# Patient Record
Sex: FEMALE | URBAN - METROPOLITAN AREA
[De-identification: names, ages, dates, MRNs, and addresses within clinical notes are randomized per-mention and may not be internally consistent; named-entity substitution may affect disease eponyms.]

---

## 2021-11-09 ENCOUNTER — OFFICE VISIT (OUTPATIENT)
Dept: URBAN - METROPOLITAN AREA CLINIC 6 | Facility: CLINIC | Age: 56
End: 2021-11-09
Payer: MEDICARE

## 2021-11-09 DIAGNOSIS — H04.123 TEAR FILM INSUFFICIENCY OF BILATERAL LACRIMAL GLANDS: ICD-10-CM

## 2021-11-09 DIAGNOSIS — E11.9 TYPE 2 DIABETES MELLITUS WITHOUT COMPLICATIONS: Primary | ICD-10-CM

## 2021-11-09 DIAGNOSIS — H25.813 COMBINED FORMS OF AGE-RELATED CATARACT, BILATERAL: ICD-10-CM

## 2021-11-09 DIAGNOSIS — H53.8 OTHER VISUAL DISTURBANCES: ICD-10-CM

## 2021-11-09 DIAGNOSIS — H43.313 VITREOUS MEMBRANES AND STRANDS, BILATERAL: ICD-10-CM

## 2021-11-09 PROCEDURE — 92004 COMPRE OPH EXAM NEW PT 1/>: CPT | Performed by: OPTOMETRIST

## 2021-11-09 ASSESSMENT — INTRAOCULAR PRESSURE
OD: 18
OS: 12

## 2021-11-09 NOTE — IMPRESSION/PLAN
Impression: Tear film insufficiency of bilateral lacrimal glands: H04.123. Plan: Mild dry eye both eyes - Recommend use of high quality artificial tears 3-4x per day. 98

## 2021-11-09 NOTE — IMPRESSION/PLAN
Impression: Type 2 diabetes mellitus without complications: J92.4. Plan: Diabetes Mellitus Type II without signs of diabetic retinopathy either eye - Discussed the pathophysiology of diabetes and its effect on the eye. Stressed the importance of strong glucose control. Advised of importance of at least annual dilated examinations, and to contact us immediately for any problems or concerns.

## 2022-11-10 ENCOUNTER — OFFICE VISIT (OUTPATIENT)
Dept: URBAN - METROPOLITAN AREA CLINIC 6 | Facility: CLINIC | Age: 57
End: 2022-11-10
Payer: MEDICARE

## 2022-11-10 DIAGNOSIS — E11.9 TYPE 2 DIABETES MELLITUS WITHOUT COMPLICATIONS: Primary | ICD-10-CM

## 2022-11-10 DIAGNOSIS — H11.042 PERIPHERAL PTERYGIUM, STATIONARY, LEFT EYE: ICD-10-CM

## 2022-11-10 DIAGNOSIS — H25.813 COMBINED FORMS OF AGE-RELATED CATARACT, BILATERAL: ICD-10-CM

## 2022-11-10 DIAGNOSIS — H43.313 VITREOUS MEMBRANES AND STRANDS, BILATERAL: ICD-10-CM

## 2022-11-10 DIAGNOSIS — H04.123 TEAR FILM INSUFFICIENCY OF BILATERAL LACRIMAL GLANDS: ICD-10-CM

## 2022-11-10 PROCEDURE — 92014 COMPRE OPH EXAM EST PT 1/>: CPT | Performed by: OPTOMETRIST

## 2022-11-10 ASSESSMENT — INTRAOCULAR PRESSURE
OD: 16
OS: 14

## 2022-11-10 NOTE — IMPRESSION/PLAN
Impression: Type 2 diabetes mellitus without complications: B51.9. Plan: Diabetes Mellitus Type II without signs of diabetic retinopathy either eye - Discussed the pathophysiology of diabetes and its effect on the eye. Stressed the importance of strong glucose control. Advised of importance of at least annual dilated examinations, and to contact us immediately for any problems or concerns.

## 2022-11-10 NOTE — IMPRESSION/PLAN
Impression: Peripheral pterygium, stationary, left eye: H11.042. Plan: Pterygium : advised patient to avoid UV exposure with a hat and sunglasses when outdoors. Use artificial tears PRN. Return for any change in size, decreased vision, or other problems or concerns.